# Patient Record
(demographics unavailable — no encounter records)

---

## 2025-03-18 NOTE — HISTORY OF PRESENT ILLNESS
[de-identified] : 03/18/2025 ZACHARY EWING 69-year female Here for evaluation of left knee pain. patient denies any injury; states she has been having pain in left knee since last years. traveled to [place] when she came back in 02/25, she started feeling the pain increase with some swelling. was seen by PCP and was told she has arthritis in the knee. Has aspiration last year. No injections.

## 2025-03-18 NOTE — IMAGING
[de-identified] : LEFT KNEE EXAM Alignment:  Valgus Effusion: None Atrophy: None                                                  Stable to Varus/valgus stress Posterior Drawer Test: negative Anterior Drawer Test: Negative Knee Extension/Flexion: 0 / 120  Medial/lateral compartments Medial joint line: No Tenderness Lateral joint line: POS Tenderness Clementina test: negative  Patellofemoral joint Medial patellar facet: no tenderness Patellar grind: Negative  Tendons: Pes Anserine: No tenderness Gerdys Tubercle/ IT Band: No tenderness Quadriceps Tendon: No Tenderness patellar tendon: no Tenderness Tibial tubercle: not tenderness Calf: no Tenderness  Neurovascular exam Muscle strength: 5/5 Sensation to light touch: intact Distal pulses: 2+  [id number] Xrays of the Left Knee were taken demonstrating tricompartmental arthritis with joint space collapse, osteophytes, and sclerosis

## 2025-07-24 NOTE — IMAGING
[de-identified] : DEYANIRA KNEE EXAM Alignment:  Valgus Effusion: None Atrophy: None                                                  Stable to Varus/valgus stress Posterior Drawer Test: negative Anterior Drawer Test: Negative Knee Extension/Flexion: 0 / 120  Medial/lateral compartments Medial joint line: No Tenderness Lateral joint line: POS Tenderness Clementina test: negative  Patellofemoral joint Medial patellar facet: no tenderness Patellar grind: Negative  Tendons: Pes Anserine: No tenderness Gerdys Tubercle/ IT Band: No tenderness Quadriceps Tendon: No Tenderness patellar tendon: no Tenderness Tibial tubercle: not tenderness Calf: no Tenderness  Neurovascular exam Muscle strength: 5/5 Sensation to light touch: intact Distal pulses: 2+  [id number] Xrays of the Left Knee were taken demonstrating tricompartmental arthritis with joint space collapse, osteophytes, and sclerosis 2025 RIGHT tricompartmental arthritis with joint space collapse, osteophytes, and sclerosis

## 2025-07-24 NOTE — HISTORY OF PRESENT ILLNESS
[de-identified] : 03/18/2025 ZACHARY EWING 69-year female Here for evaluation of left knee pain. patient denies any injury; states she has been having pain in left knee since last years. traveled to [place] when she came back in 02/25, she started feeling the pain increase with some swelling. was seen by PCP and was told she has arthritis in the knee. Has aspiration last year. No injections.   07/24/2025 patient here for follow up. states no co changes since last visit. she is now complaining of pain in the right knee . and is also having swelling in her feet

## 2025-07-24 NOTE — PROCEDURE
[FreeTextEntry3] : The risks, benefits and alternatives to the injection were discussed with the patient. The decision was made to proceed with the injection to reduce inflammation within the area. Verbal consent was obtained for the procedure. The Bilateral knee was cleaned with alcohol and ethyl chloride was sprayed topically. 1cc of 40mg Kenalog and 4cc of 1% lidocaine was injected. The patient tolerated the procedure well and was instructed to ice the affected joint as need+-ed later today. Activities can be performed as tolerated

## 2025-07-24 NOTE — ASSESSMENT
[FreeTextEntry1] : 68 y/o F with L knee OA mobic sent to pharmacy CSI L knee Follow up PRN, months   07/24/2025 bilateral knee CSI 3 months fu

## 2025-07-24 NOTE — IMAGING
[de-identified] : DEYANIRA KNEE EXAM Alignment:  Valgus Effusion: None Atrophy: None                                                  Stable to Varus/valgus stress Posterior Drawer Test: negative Anterior Drawer Test: Negative Knee Extension/Flexion: 0 / 120  Medial/lateral compartments Medial joint line: No Tenderness Lateral joint line: POS Tenderness Clementina test: negative  Patellofemoral joint Medial patellar facet: no tenderness Patellar grind: Negative  Tendons: Pes Anserine: No tenderness Gerdys Tubercle/ IT Band: No tenderness Quadriceps Tendon: No Tenderness patellar tendon: no Tenderness Tibial tubercle: not tenderness Calf: no Tenderness  Neurovascular exam Muscle strength: 5/5 Sensation to light touch: intact Distal pulses: 2+  [id number] Xrays of the Left Knee were taken demonstrating tricompartmental arthritis with joint space collapse, osteophytes, and sclerosis 2025 RIGHT tricompartmental arthritis with joint space collapse, osteophytes, and sclerosis